# Patient Record
(demographics unavailable — no encounter records)

---

## 2025-04-17 NOTE — HISTORY OF PRESENT ILLNESS
[de-identified] :  SUHA TOLBERT is a 14 year old patient seen for recurrent epistaxis. He is here with his mom and sister. He has had recurrent nosebleeds on the right side the past 2 years. 2 months ago, he started having nosebleeds bilaterally. They occur once or twice daily year round and bleed for 5-10 mins. They stop with pressure. He last bled this afternoon and stopped with pressure. He has tried nasal moisturizing gels. He had nasal trauma when he was 7 years old but no nasal surgery. No personal or family history of coagulopathy. He does not have allergies but he gets congested occasionally. He had seen ENT 2 summers ago and was recommended conservative management but cauterization was also discussed if this recurs.

## 2025-04-17 NOTE — ASSESSMENT
[FreeTextEntry1] : He has a 1-2 history of recurrent epistaxis.  He had prominent vessels on the anterior nasal septum bilaterally.  He mostly bleeds from the right side.  The vessel on the right side was cauterized today.  He tolerated it well  Plan  -Findings and management options were discussed with the patient and his mother - Literature for epistaxis management given - Patient told to avoid heavy lifting, bending, straining, nose blowing and nasal manipulation - Avoid aspirin  - Nasal saline spray PRN - bacitracin b.i.d.for one week and then a moisturizing nasal gel as needed - Patient to go to ER if there is bleeding that cannot be controlled - Follow up in 2 weeks or earlier if there is recurrent bleeding.  I will see if he has had further bleeding from the right side and will discuss cauterizing the vessel on the left side.

## 2025-04-17 NOTE — PHYSICAL EXAM
[TextEntry] : PHYSICAL EXAM   General: The patient was alert, oriented and in no distress. Voice was clear.   Face: The patient had no facial asymmetry or mass. The skin was unremarkable.   Ears: Hearing normal to conversational voice External ears were normal without deformity. Ear canals were clear. No cerumen or inflammation Tympanic membranes were intact and normal. No perforation or effusion. mobile   Nose: The external nose had no significant deformity.  There was no facial tenderness. On anterior rhinoscopy, there was dried bloody crusting anteriorly bilaterally. The anterior septum was grossly midline. There were no visualized polyps, purulence  or masses. THe nose was evaluated under the microscope   Oral cavity: Oral mucosa- normal. Oral and base of tongue- clear and without mass. Gingival and buccal mucosa- moist and without lesions. Palate- the palate moved well. There was no cleft palate. There appeared to be good salivary flow.  Oral cavity/oropharynx- no pus, erythema or mass   Neck: The neck was symmetrical. The parotid and submandibular glands were normal without masses. The trachea was midline and there was no unusual crepitus. Thyroid was smooth and nontender and no masses were palpated. No masses   Lymphatics: Cervical adenopathy- none.

## 2025-07-01 NOTE — ASSESSMENT
[FreeTextEntry1] : He has a history of recurrent epistaxis.  He did well after the bleeding source on the right anterior nasal septum was cauterized in April.  He has had recurrent bleeding from the left side.  This was cauterized today.  He tolerated it well  Plan  -Findings and management options were discussed with the patient and his mother - Continue precautions for epistaxis. - He should avoid straining, nose blowing and nasal manipulation - bacitracin b.i.d.for 1 to 2 weeks and then a moisturizing nasal gel as needed - I will see him back in 2 weeks to check the nose if needed.  They will call and return at any time if he has recurrent bleeding or any other concerns.

## 2025-07-01 NOTE — PHYSICAL EXAM
[TextEntry] : General: The patient was alert, oriented and in no distress. Voice was clear.  Face: The patient had no facial asymmetry or mass. The skin was unremarkable.  Ears: Hearing normal to conversational voice External ears were normal without deformity. Ear canals were clear. No cerumen or inflammation Tympanic membranes were intact and normal. No perforation or effusion. mobile  Nose: The external nose had no significant deformity. There was no facial tenderness. On anterior rhinoscopy, the right side was normal.  There was a small vessel on the left anterior nasal septum with a small scab.  There was no active bleeding.  The anterior septum was grossly midline. There were no visualized polyps, purulence or masses. Te nose was evaluated under the microscope  Oral cavity: Oral mucosa- normal. Oral and base of tongue- clear and without mass. Gingival and buccal mucosa- moist and without lesions. Palate- the palate moved well. There was no cleft palate. There appeared to be good salivary flow. Oral cavity/oropharynx- no pus, erythema or mass  Neck: The neck was symmetrical. The parotid and submandibular glands were normal without masses. The trachea was midline and there was no unusual crepitus. Thyroid was smooth and nontender and no masses were palpated. No masses  Lymphatics: Cervical adenopathy- none.       PROCEDURE- CAUTERIZATION OF ANTERIOR EPISTAXIS  Indication: Epistaxis Procedure: cauterization of epistaxis-left anterior nasal septum Surgeon: Dr. Goldstein Consent: Treatment options were again reviewed with the patient's mother.  They wish to proceed with cauterization.  They are aware of the risks as they were discussed in detail at his last visit.  Verbal consent was obtained. Anesthetic: Topical lidocaine and oxymetazoline. Procedure: The patient was positioned comfortably. The nose was anesthetized with topical lidocaine and decongested with oxymetazoline.  After the nose was anesthetized, cauterization of the bleeding source on the left anterior septum was performed using silver nitrate. The patient tolerated the procedure well. There were no complications.

## 2025-07-01 NOTE — HISTORY OF PRESENT ILLNESS
[de-identified] : SUHA TOLBERT is a 14 year old patient With a history of recurrent epistaxis.  A bleeding source on the right anterior nasal septum was cauterized in April.  He has not had bleeding from that side.  He has had persistent bleeding from the left side.  He gets approximately 1 episode per day which can last less than 5 minutes.  It does not bleed as heavily as the right side.  He is able to stop it with pressure.  ENT history He had nasal trauma when he was 7 years old but no nasal surgery. No personal or family history of coagulopathy. He does not have allergies but he gets congested occasionally